# Patient Record
Sex: MALE | URBAN - METROPOLITAN AREA
[De-identification: names, ages, dates, MRNs, and addresses within clinical notes are randomized per-mention and may not be internally consistent; named-entity substitution may affect disease eponyms.]

---

## 2017-07-18 ENCOUNTER — TELEPHONE (OUTPATIENT)
Dept: FAMILY MEDICINE | Facility: CLINIC | Age: 57
End: 2017-07-18

## 2017-07-18 NOTE — TELEPHONE ENCOUNTER
----- Message from Ryder Araujo MD sent at 7/18/2017  4:31 PM CDT -----  Thanks for the referral!    Gretel, Can we please be sure that this patient gets my next available appointment.     Thank you,  Yury  ----- Message -----  From: Drew Estrella MD  Sent: 7/18/2017   3:22 PM  To: Ryder Araujo MD    Sending you this marylou. No PCP.  Appears healthy but unintentional wt loss of 30 lbs.  Negative GI ROS.  Also sending to [facility].  Has LIH that I will table until his work up is complete.    Thanks

## 2017-07-18 NOTE — TELEPHONE ENCOUNTER
Voicemail message left for patient to call the clinic regarding message below.    MD Ryder Cobian MD  Updated contact info from our system as below.     [address] [facility]     [phone]